# Patient Record
(demographics unavailable — no encounter records)

---

## 2024-10-16 NOTE — HISTORY OF PRESENT ILLNESS
[FreeTextEntry1] : Ms. Wells is 62-year-old female who returns today in follow up with regard to a history of Type 2 Diabetes Mellitus and Hypothyroidism.   Regarding the Diabetes, there is no known history of retinopathy, or nephropathy. With regard to neuropathy, she denies any neurologic s/s.  Current DM medications include Metformin 1000mg BiD and Ozempic 2mg sc weekly. Overall, tolerating the medications well.  HGM of late has not been tested of late. She does deny any significant hypoglycemic signs and symptoms of late.  Patient is seeing weight loss on Ozempic. Her current weight is 181 pounds, previously 190 pounds at the last visit in April 2024.   Activity: bikes and walks   Recent A1C returned at 6.1% on labs. Was previously 6.3% in April 2024.  POCT glucose today at 105 mg/dL.  Denies any chest pain, sob, neurologic or ophthalmologic complaints. He too denies any new podiatric concerns. Ophthalmologic evaluation is up to date- no diabetic changes noted. ____________________________________________________________________________________________________  For the Hypothyroidism, the patient is currently taking Levothyroxine 88mcg daily.  Recent labs 10/11/2024 with normal TFTs ---> TSH 1.68, FT4 at 1.7, and FT3 at 2.27.   She has been compliant in taking the LT4 daily, away from food or any medication that may inhibit absorption. She has tolerated this medication well without any apparent adverse effects.  She denies any temperature intolerance, significant weight changes, or severe fatigue. She in addition denies any palpitations, tremors, anxiousness, change in bowel habits or significant change in moods. ____________________________________________________________________________________________________  Additional medical history includes that of hypertension, hyperlipidemia Additional Medications: Atorvastatin 40mg daily, Amlodipine 10mg, Valsartan 320mg, Chlorthalidone 325mg. Supplements: vitamin D3 2,000 IU daily

## 2024-10-16 NOTE — ADDENDUM
[FreeTextEntry1] : POCT glucose testing was carried out today given diabetes diagnosis.  This note was written by Moncho Cosby on 10/16/2024 acting as medical scribe for Dr. London Mallory. I, Dr. London Mallory, have read and attest that all the information, medical decision making and discharge instructions within are true and accurate.

## 2024-10-23 NOTE — HISTORY OF PRESENT ILLNESS
[FreeTextEntry1] : ALEX GONZALEZ is a 61 year old F w/ pmhx of CAD CAC 28 HLD, HTN, DM2 who presents today for routine follow up. The patient denies fever, chills, sore throat, loss of taste or smell, muscle aches weight loss, malaise, rash, recent travel, insect bites, alteration bowel habits, headaches, weakness, abdominal pain, bloating, changes in urination, visual disturbances, shortness of breath, chest pain, dizziness, palpitations.  The patient presents for routine follow up of their coronary artery disease. The patient has been following all secondary prevents measures and antiplatelet therapy. The patient denies shortness of breath, chest pain, dizziness, palpitations.  The patient is here for follow-up of elevated cholesterol. Patient is currently tolerating medication and denies muscle pain, joint pain, back pain, urinary changes , nausea, vomiting, abdominal pain or diarrhea. The patient is trying to follow a low cholesterol diet.  The patient here for evaluation of high blood pressure. Patient is currently tolerating the current antihypertensive regime and they deny headaches, stiff neck, visual changes, or PND. The patient has been trying to stay on a low-sodium diet.  The patient also presents for continued care of diabetes mellitus. Patient is following the diabetic regimen. Patient is tolerating hypoglycemic agents and following the diet. Patient denies any visual changes, blood in the urine, abdominal pain, nausea, vomiting, myalgias, arthralgias, paresthesias and syncope. The patient denies any chest discomfort, shortness of breath or new neurologic signs or symptoms. Patient denies any polyuria, worsening nocturia, or polydipsia.

## 2025-04-30 NOTE — HISTORY OF PRESENT ILLNESS
[FreeTextEntry1] : Ms. Wells is 62-year-old female who returns today in follow up with regard to a history of Type 2 Diabetes Mellitus and Hypothyroidism.   Regarding the Diabetes, there is no known history of retinopathy, or nephropathy. With regard to neuropathy, she denies any neurologic s/s.  Current DM medications include Metformin 1000mg BiD and Ozempic 2mg sc weekly. Overall, tolerating the medications well.  HGM of late has not been tested of late.  She does deny any significant hypoglycemic signs and symptoms of late.  Patient is seeing weight loss on Ozempic. Her current weight is 179 pounds, previously 181 pounds at the last visit in October 2024.   Activity: bikes and walks   POCT A1C returned today at 5.7 %. Previously returned at 6.1% in October 2024. POCT glucose today at  96 mg/dL.  Denies any chest pain, sob, neurologic or ophthalmologic complaints. He too denies any new podiatric concerns. Ophthalmologic evaluation is up to date- no diabetic changes noted. ____________________________________________________________________________________________________  For the Hypothyroidism, the patient is currently taking Levothyroxine 88mcg daily.  Labs 10/11/2024 with normal TFTs ---> TSH 1.68, FT4 at 1.7, and FT3 at 2.27.   She has been compliant in taking the LT4 daily, away from food or any medication that may inhibit absorption. She has tolerated this medication well without any apparent adverse effects.  She denies any temperature intolerance, significant weight changes, or severe fatigue. She in addition denies any palpitations, tremors, anxiousness, change in bowel habits or significant change in moods. ____________________________________________________________________________________________________  Additional medical history includes that of hypertension, hyperlipidemia  Additional Medications: Atorvastatin 40mg daily, Amlodipine 10mg, Valsartan 320mg, Chlorthalidone 325mg.  Supplements: vitamin D3 2,000 IU daily

## 2025-04-30 NOTE — HISTORY OF PRESENT ILLNESS
[FreeTextEntry1] : This is a 62 year y/o female with a PMHx of CAD CAC 28 HLD, HTN, DM2 presents today for follow up.   CAC 28 (2022)  Negative lipoprotein a   The patient presents for routine follow up of their coronary artery disease. The patient has been following all secondary prevents measures and antiplatelet therapy. The patient denies shortness of breath, chest pain, dizziness, palpitations.  The patient is here for follow-up of elevated cholesterol. Patient is currently tolerating medication and denies muscle pain, joint pain, back pain, urinary changes , nausea, vomiting, abdominal pain or diarrhea. The patient is trying to follow a low cholesterol diet.  The patient here for evaluation of high blood pressure. Patient is currently tolerating the current antihypertensive regime and they deny headaches, stiff neck, visual changes, or PND. The patient has been trying to stay on a low-sodium diet.  The patient also presents for continued care of diabetes mellitus. Patient is following the diabetic regimen. Patient is tolerating hypoglycemic agents and following the diet. Patient denies any visual changes, blood in the urine, abdominal pain, nausea, vomiting, myalgias, arthralgias, paresthesia and syncope. The patient denies any chest discomfort, shortness of breath or new neurologic signs or symptoms. Patient denies any polyuria, worsening nocturia, or polydipsia.  Pt doing well on Ozempic 2mg, doing well. Addressing with Dr Mallory.